# Patient Record
Sex: MALE | Race: WHITE | Employment: FULL TIME | URBAN - METROPOLITAN AREA
[De-identification: names, ages, dates, MRNs, and addresses within clinical notes are randomized per-mention and may not be internally consistent; named-entity substitution may affect disease eponyms.]

---

## 2019-01-08 ENCOUNTER — OFFICE VISIT (OUTPATIENT)
Dept: INTERNAL MEDICINE | Age: 49
End: 2019-01-08
Payer: COMMERCIAL

## 2019-01-08 VITALS
DIASTOLIC BLOOD PRESSURE: 84 MMHG | SYSTOLIC BLOOD PRESSURE: 138 MMHG | HEIGHT: 72 IN | HEART RATE: 82 BPM | WEIGHT: 315 LBS | BODY MASS INDEX: 42.66 KG/M2

## 2019-01-08 DIAGNOSIS — Z13.220 SCREENING CHOLESTEROL LEVEL: ICD-10-CM

## 2019-01-08 DIAGNOSIS — Z13.1 SCREENING FOR DIABETES MELLITUS: ICD-10-CM

## 2019-01-08 DIAGNOSIS — Z00.00 ANNUAL PHYSICAL EXAM: Primary | ICD-10-CM

## 2019-01-08 PROCEDURE — 99386 PREV VISIT NEW AGE 40-64: CPT | Performed by: FAMILY MEDICINE

## 2019-01-08 RX ORDER — WARFARIN SODIUM 5 MG/1
TABLET ORAL
Refills: 10 | COMMUNITY
Start: 2018-12-15

## 2019-01-08 ASSESSMENT — PATIENT HEALTH QUESTIONNAIRE - PHQ9
1. LITTLE INTEREST OR PLEASURE IN DOING THINGS: 0
2. FEELING DOWN, DEPRESSED OR HOPELESS: 0
SUM OF ALL RESPONSES TO PHQ QUESTIONS 1-9: 0
SUM OF ALL RESPONSES TO PHQ QUESTIONS 1-9: 0
SUM OF ALL RESPONSES TO PHQ9 QUESTIONS 1 & 2: 0

## 2019-01-08 ASSESSMENT — ENCOUNTER SYMPTOMS
CHEST TIGHTNESS: 0
EYE ITCHING: 0
EYE PAIN: 0
CHOKING: 0
APNEA: 0
EYE DISCHARGE: 0

## 2019-01-14 DIAGNOSIS — Z13.220 SCREENING CHOLESTEROL LEVEL: ICD-10-CM

## 2019-01-14 DIAGNOSIS — Z13.1 SCREENING FOR DIABETES MELLITUS: ICD-10-CM

## 2019-01-14 LAB
ALBUMIN SERPL-MCNC: 4.3 G/DL (ref 3.9–4.9)
ALP BLD-CCNC: 46 U/L (ref 35–104)
ALT SERPL-CCNC: 24 U/L (ref 0–41)
ANION GAP SERPL CALCULATED.3IONS-SCNC: 10 MEQ/L (ref 7–13)
AST SERPL-CCNC: 20 U/L (ref 0–40)
BILIRUB SERPL-MCNC: 0.4 MG/DL (ref 0–1.2)
BUN BLDV-MCNC: 15 MG/DL (ref 6–20)
CALCIUM SERPL-MCNC: 9.1 MG/DL (ref 8.6–10.2)
CHLORIDE BLD-SCNC: 105 MEQ/L (ref 98–107)
CHOLESTEROL, TOTAL: 133 MG/DL (ref 0–199)
CO2: 26 MEQ/L (ref 22–29)
CREAT SERPL-MCNC: 0.62 MG/DL (ref 0.7–1.2)
GFR AFRICAN AMERICAN: >60
GFR NON-AFRICAN AMERICAN: >60
GLOBULIN: 2.7 G/DL (ref 2.3–3.5)
GLUCOSE BLD-MCNC: 96 MG/DL (ref 74–109)
HDLC SERPL-MCNC: 40 MG/DL (ref 40–59)
LDL CHOLESTEROL CALCULATED: 67 MG/DL (ref 0–129)
POTASSIUM SERPL-SCNC: 4.9 MEQ/L (ref 3.5–5.1)
SODIUM BLD-SCNC: 141 MEQ/L (ref 132–144)
TOTAL PROTEIN: 7 G/DL (ref 6.4–8.1)
TRIGL SERPL-MCNC: 129 MG/DL (ref 0–200)

## 2019-04-08 ENCOUNTER — OFFICE VISIT (OUTPATIENT)
Dept: INTERNAL MEDICINE | Age: 49
End: 2019-04-08
Payer: COMMERCIAL

## 2019-04-08 VITALS
HEART RATE: 86 BPM | WEIGHT: 315 LBS | OXYGEN SATURATION: 96 % | SYSTOLIC BLOOD PRESSURE: 134 MMHG | TEMPERATURE: 98.4 F | DIASTOLIC BLOOD PRESSURE: 70 MMHG | HEIGHT: 72 IN | BODY MASS INDEX: 42.66 KG/M2

## 2019-04-08 DIAGNOSIS — R50.9 FEVER, UNSPECIFIED FEVER CAUSE: Primary | ICD-10-CM

## 2019-04-08 DIAGNOSIS — B34.9 VIRAL ILLNESS: ICD-10-CM

## 2019-04-08 LAB
INFLUENZA A ANTIBODY: NEGATIVE
INFLUENZA B ANTIBODY: NEGATIVE

## 2019-04-08 PROCEDURE — 99213 OFFICE O/P EST LOW 20 MIN: CPT | Performed by: NURSE PRACTITIONER

## 2019-04-08 PROCEDURE — 87804 INFLUENZA ASSAY W/OPTIC: CPT | Performed by: NURSE PRACTITIONER

## 2019-04-08 NOTE — PROGRESS NOTES
Patient: Tessy Brar    YOB: 1970    Date: 4/9/19       Patient Active Problem List    Diagnosis Date Noted    Pulmonary embolism [facility])      Past Medical History:   Diagnosis Date    DVT (deep venous thrombosis) (HCC)     Heart beat abnormality     due to caffeine    Hypertension     Phlebitis     Pulmonary embolism (HCC)      Past Surgical History:   Procedure Laterality Date    ABDOMINAL HERNIA REPAIR      CARPAL TUNNEL RELEASE Left     CHOLECYSTECTOMY      INGUINAL HERNIA REPAIR      KNEE SURGERY Bilateral     MANDIBLE SURGERY      ROTATOR CUFF REPAIR      SHOULDER SURGERY      WISDOM TOOTH EXTRACTION       Family History   Adopted: Yes   Family history unknown: Yes     Social History     Socioeconomic History    Marital status:      Spouse name: Not on file    Number of children: Not on file    Years of education: Not on file    Highest education level: Not on file   Occupational History    Not on file   Social Needs    Financial resource strain: Not on file    Food insecurity:     Worry: Not on file     Inability: Not on file    Transportation needs:     Medical: Not on file     Non-medical: Not on file   Tobacco Use    Smoking status: Former Smoker     Packs/day: 1.00     Years: 15.00     Pack years: 15.00     Last attempt to quit: 1/8/2009     Years since quitting: 10.2    Smokeless tobacco: Former User     Quit date: 1/8/2009   Substance and Sexual Activity    Alcohol use:  Yes    Drug use: No    Sexual activity: Yes     Partners: Female   Lifestyle    Physical activity:     Days per week: Not on file     Minutes per session: Not on file    Stress: Not on file   Relationships    Social connections:     Talks on phone: Not on file     Gets together: Not on file     Attends Rastafarian service: Not on file     Active member of club or organization: Not on file     Attends meetings of clubs or organizations: Not on file     Relationship status: Not on file   [place] Intimate partner violence:     Fear of current or ex partner: Not on file     Emotionally abused: Not on file     Physically abused: Not on file     Forced sexual activity: Not on file   Other Topics Concern    Not on file   Social History Narrative    Not on file     Current Outpatient Medications on File Prior to Visit   Medication Sig Dispense Refill    warfarin (COUMADIN) 5 MG tablet TAKE 2 TABLETS BY MOUTH EVERY DAY  10     No current facility-administered medications on file prior to visit. Allergies   Allergen Reactions    Seasonal        Chief Complaint   Patient presents with    Fatigue     x 2 days     Nausea     x 2 days     Fever     101.8  today        HPI  Symptoms: flu like symptom  Severity: moderate  Duration: 2 days  Alleviating/aggravting factors: motrin / tylenol slightly helping  Associated signs & symptoms: fever, dizziness, nausea, body aches    Review of Systems    Review of Systems   Constitutional: Positive for fever. HENT: Negative. Eyes: Negative. Respiratory: Negative. Cardiovascular: Negative. Gastrointestinal: Positive for nausea. Genitourinary: Negative. Musculoskeletal: Positive for myalgias. Skin: Negative. Neurological: Positive for dizziness. Psychiatric/Behavioral: Negative. Physical Exam  Vitals:    04/08/19 1822   BP: 134/70   Pulse: 86   Temp: 98.4 °F (36.9 °C)   SpO2: 96%   Body mass index is 55.71 kg/m². Physical Exam   Constitutional: He is oriented to person, place, and time. He appears well-developed. HENT:   Right Ear: External ear normal.   Left Ear: External ear normal.   Nose: Nose normal.   Mouth/Throat: No oropharyngeal exudate. Slight erythema to bilateral tms   Eyes: Conjunctivae are normal. Right eye exhibits no discharge. Left eye exhibits no discharge. Neck: Normal range of motion. Cardiovascular: Normal rate, regular rhythm and normal heart sounds.    Pulmonary/Chest: Effort normal and breath sounds normal. Abdominal: Soft. He exhibits no distension. Musculoskeletal: Normal range of motion. Neurological: He is alert and oriented to person, place, and time. Skin: Skin is warm and dry. He is not diaphoretic. Psychiatric: He has a normal mood and affect. Assessment:  Lara Fuchs was seen today for fatigue, nausea and fever. Diagnoses and all orders for this visit:    Fever, unspecified fever cause  -     POCT Influenza A/B    Viral illness  -     POCT Influenza A/B          Orders Placed This Encounter   Procedures    POCT Influenza A/B      Explained viral etiology and advised supportive care, over the counter analgesics for symptomatic therapy, tylenol/motrin for fevers, Fluids, rest  Advised patient to use otc antihistamine  Signs of worsening infection explained, signs of dehydration explained, to seek medical attention if they occur  rtc if not better  If not better at 10-14 day viral advised he/she can call in for antibiotics script  Hand out provided    Return for Follow up with pcp.

## 2019-04-08 NOTE — LETTER
[facility] [address] [address]  Phone: [phone]  Fax: [phone]    JEANIE Smalls CNP        April 8, 2019     Patient: Rell Vega   YOB: 1970   Date of Visit: 4/8/2019       To Whom it May Concern:    Rell Vega was seen in my clinic on 4/8/2019. He may return to work on 04/09/19. If you have any questions or concerns, please don't hesitate to call.     Sincerely,         JEANIE Smalls CNP

## 2019-04-09 ASSESSMENT — ENCOUNTER SYMPTOMS
EYES NEGATIVE: 1
RESPIRATORY NEGATIVE: 1
NAUSEA: 1

## 2019-04-10 ENCOUNTER — OFFICE VISIT (OUTPATIENT)
Dept: INTERNAL MEDICINE | Age: 49
End: 2019-04-10
Payer: COMMERCIAL

## 2019-04-10 VITALS
DIASTOLIC BLOOD PRESSURE: 82 MMHG | BODY MASS INDEX: 42.66 KG/M2 | RESPIRATION RATE: 22 BRPM | OXYGEN SATURATION: 97 % | TEMPERATURE: 97.5 F | HEART RATE: 87 BPM | SYSTOLIC BLOOD PRESSURE: 128 MMHG | HEIGHT: 72 IN | WEIGHT: 315 LBS

## 2019-04-10 DIAGNOSIS — R35.0 URINE FREQUENCY: ICD-10-CM

## 2019-04-10 DIAGNOSIS — N30.01 ACUTE CYSTITIS WITH HEMATURIA: Primary | ICD-10-CM

## 2019-04-10 DIAGNOSIS — N30.01 ACUTE CYSTITIS WITH HEMATURIA: ICD-10-CM

## 2019-04-10 LAB
BILIRUBIN, POC: ABNORMAL
BLOOD URINE, POC: ABNORMAL
CLARITY, POC: ABNORMAL
COLOR, POC: ABNORMAL
GLUCOSE URINE, POC: ABNORMAL
KETONES, POC: ABNORMAL
LEUKOCYTE EST, POC: ABNORMAL
NITRITE, POC: ABNORMAL
PH, POC: 5.5
PROTEIN, POC: 15
SPECIFIC GRAVITY, POC: 1.02
UROBILINOGEN, POC: 0.2

## 2019-04-10 PROCEDURE — 81003 URINALYSIS AUTO W/O SCOPE: CPT | Performed by: NURSE PRACTITIONER

## 2019-04-10 PROCEDURE — 99213 OFFICE O/P EST LOW 20 MIN: CPT | Performed by: NURSE PRACTITIONER

## 2019-04-10 RX ORDER — PHENAZOPYRIDINE HYDROCHLORIDE 200 MG/1
200 TABLET, FILM COATED ORAL 3 TIMES DAILY PRN
Qty: 10 TABLET | Refills: 0 | Status: SHIPPED | OUTPATIENT
Start: 2019-04-10 | End: 2019-04-12

## 2019-04-10 RX ORDER — NITROFURANTOIN 25; 75 MG/1; MG/1
100 CAPSULE ORAL 2 TIMES DAILY
Qty: 14 CAPSULE | Refills: 0 | Status: SHIPPED | OUTPATIENT
Start: 2019-04-10 | End: 2019-04-17

## 2019-04-10 ASSESSMENT — ENCOUNTER SYMPTOMS
NAUSEA: 0
VOMITING: 0
COUGH: 0
DIARRHEA: 0
ABDOMINAL PAIN: 1
WHEEZING: 0
RHINORRHEA: 0
SORE THROAT: 0
SHORTNESS OF BREATH: 0

## 2019-04-10 NOTE — PROGRESS NOTES
Subjective  Semaj Oscar, 54 y.o. male presents today with:  Chief Complaint   Patient presents with    Urinary Tract Infection     dark urine w blood, has a blood clot pass today urine frequency, pressure, feels like urine doen't stop     Diarrhea     today        Urinary Tract Infection    This is a new problem. Episode onset: 2 weeks ago. The problem occurs every urination. The problem has been gradually worsening. The quality of the pain is described as burning. The pain is at a severity of 3/10 (7-8/10 when voiding). The pain is severe. The maximum temperature recorded prior to his arrival was 101 - 101.9 F. The fever has been present for 1 - 2 days. He is sexually active. There is no history of pyelonephritis. Associated symptoms include chills, frequency, hematuria and urgency. Pertinent negatives include no flank pain, nausea or vomiting. He has tried increased fluids for the symptoms. Review of Systems   Constitutional: Positive for chills, fatigue and fever. HENT: Negative for congestion, rhinorrhea and sore throat. Respiratory: Negative for cough, shortness of breath and wheezing. Gastrointestinal: Positive for abdominal pain (pressure). Negative for diarrhea, nausea and vomiting. Genitourinary: Positive for frequency, hematuria and urgency. Negative for flank pain.        Past Medical History:   Diagnosis Date    [facility] ([facility])     Heart beat abnormality     due to caffeine    Hypertension     Phlebitis     Pulmonary embolism (HCC)      Past Surgical History:   Procedure Laterality Date    ABDOMINAL HERNIA REPAIR      CARPAL TUNNEL RELEASE Left     CHOLECYSTECTOMY      INGUINAL HERNIA REPAIR      KNEE SURGERY Bilateral     MANDIBLE SURGERY      ROTATOR CUFF REPAIR      SHOULDER SURGERY      WISDOM TOOTH EXTRACTION       Social History     Socioeconomic History    Marital status:      Spouse name: Not on file    Number of children: Not on file    Years of education: Not on file    Highest education level: Not on file   Occupational History    Not on file   Social Needs    Financial resource strain: Not on file    Food insecurity:     Worry: Not on file     Inability: Not on file    Transportation needs:     Medical: Not on file     Non-medical: Not on file   Tobacco Use    Smoking status: Former Smoker     Packs/day: 1.00     Years: 15.00     Pack years: 15.00     Last attempt to quit: 1/8/2009     Years since quitting: 10.2    Smokeless tobacco: Former User     Quit date: 1/8/2009   Substance and Sexual Activity    Alcohol use: Yes    Drug use: No    Sexual activity: Yes     Partners: Female   Lifestyle    Physical activity:     Days per week: Not on file     Minutes per session: Not on file    Stress: Not on file   Relationships    Social connections:     Talks on phone: Not on file     Gets together: Not on file     Attends [employer] service: Not on file     Active member of club or organization: Not on file     Attends meetings of clubs or organizations: Not on file     Relationship status: Not on file    Intimate partner violence:     Fear of current or ex partner: Not on file     Emotionally abused: Not on file     Physically abused: Not on file     Forced sexual activity: Not on file   Other Topics Concern    Not on file   Social History Narrative    Not on file     Family History   Adopted: Yes   Family history unknown: Yes     Allergies   Allergen Reactions    Seasonal      Current Outpatient Medications   Medication Sig Dispense Refill    nitrofurantoin, macrocrystal-monohydrate, (MACROBID) 100 MG capsule Take 1 capsule by mouth 2 times daily for 7 days 14 capsule 0    phenazopyridine (PYRIDIUM) 200 MG tablet Take 1 tablet by mouth 3 times daily as needed for Pain (Painful Urination) May turn urine orange.  10 tablet 0    warfarin (COUMADIN) 5 MG tablet TAKE 2 TABLETS BY MOUTH EVERY DAY  10     No current facility-administered medications for this visit. PMH, Surgical Hx, Family Hx, and Social Hx reviewed and updated. Health Maintenance reviewed. Objective  Vitals:    04/10/19 1501   BP: 128/82   Pulse: 87   Resp: 22   Temp: 97.5 °F (36.4 °C)   TempSrc: Oral   SpO2: 97%   Weight: (!) 410 lb (186 kg)   Height: 6' (1.829 m)     BP Readings from Last 3 Encounters:   04/10/19 128/82   04/08/19 134/70   01/08/19 138/84     Wt Readings from Last 3 Encounters:   04/10/19 (!) 410 lb (186 kg)   04/08/19 (!) 410 lb 12.8 oz (186.3 kg)   01/08/19 (!) 406 lb 3.2 oz (184.3 kg)     Physical Exam   Constitutional: He is oriented to person, place, and time. He appears well-developed and well-nourished. He is active. HENT:   Right Ear: Tympanic membrane normal.   Left Ear: Tympanic membrane normal.   Nose: Nose normal. Right sinus exhibits no maxillary sinus tenderness and no frontal sinus tenderness. Left sinus exhibits no maxillary sinus tenderness and no frontal sinus tenderness. Mouth/Throat: Uvula is midline, oropharynx is clear and moist and mucous membranes are normal.   Eyes: Pupils are equal, round, and reactive to light. Conjunctivae and EOM are normal.   Neck: Full passive range of motion without pain. No muscular tenderness present. Cardiovascular: Normal rate, regular rhythm, S1 normal, S2 normal and normal heart sounds. Pulmonary/Chest: Effort normal and breath sounds normal. No respiratory distress. He has no wheezes. He has no rhonchi. He has no rales. Abdominal: Soft. Normal appearance and bowel sounds are normal. There is tenderness (pressure) in the suprapubic area. There is no CVA tenderness. Musculoskeletal: Normal range of motion. Neurological: He is alert and oriented to person, place, and time. He has normal strength. Skin: Skin is warm, dry and intact. No rash noted. Psychiatric: He has a normal mood and affect.  His speech is normal and behavior is normal.     Assessment & Plan Diagnosis Orders   1. Acute cystitis with hematuria  Urine Culture    nitrofurantoin, macrocrystal-monohydrate, (MACROBID) 100 MG capsule    phenazopyridine (PYRIDIUM) 200 MG tablet   2. Urine frequency  POCT Urinalysis No Micro (Auto)    Urine Culture     Orders Placed This Encounter   Procedures    Urine Culture     Standing Status:   Future     Standing Expiration Date:   4/10/2020     Order Specific Question:   Specify (ex-cath, midstream, cysto, etc)? Answer:   clean    POCT Urinalysis No Micro (Auto)     Orders Placed This Encounter   Medications    nitrofurantoin, macrocrystal-monohydrate, (MACROBID) 100 MG capsule     Sig: Take 1 capsule by mouth 2 times daily for 7 days     Dispense:  14 capsule     Refill:  0    phenazopyridine (PYRIDIUM) 200 MG tablet     Sig: Take 1 tablet by mouth 3 times daily as needed for Pain (Painful Urination) May turn urine orange. Dispense:  10 tablet     Refill:  0     There are no discontinued medications. Return in about 2 weeks (around 4/24/2019), or if symptoms worsen or fail to improve, for follow up with PCP. Reviewed with the patient: current clinical status,medications, activities and diet. Drink plenty of fluids  If no improvement after 48-72 hours return for reevaluation    Side effects, adverse effects of themedication prescribed today, as well as treatment plan/ rationale and result expectations have been discussed with the patient who expresses understanding and desires to proceed. Close follow up to evaluate treatment results and for coordination of care. I have reviewed the patient's medical history in detail and updated the computerized patient record.     JEANIE Ibarra

## 2019-04-13 LAB
ORGANISM: ABNORMAL
URINE CULTURE, ROUTINE: ABNORMAL
URINE CULTURE, ROUTINE: ABNORMAL

## 2020-06-09 ENCOUNTER — OFFICE VISIT (OUTPATIENT)
Dept: INTERNAL MEDICINE | Age: 50
End: 2020-06-09
Payer: COMMERCIAL

## 2020-06-09 VITALS
HEART RATE: 66 BPM | DIASTOLIC BLOOD PRESSURE: 98 MMHG | HEIGHT: 72 IN | WEIGHT: 315 LBS | BODY MASS INDEX: 42.66 KG/M2 | SYSTOLIC BLOOD PRESSURE: 144 MMHG | TEMPERATURE: 97.2 F | OXYGEN SATURATION: 98 %

## 2020-06-09 PROBLEM — G47.33 OSA (OBSTRUCTIVE SLEEP APNEA): Status: ACTIVE | Noted: 2020-06-09

## 2020-06-09 PROBLEM — I48.91 ATRIAL FIBRILLATION, TRANSIENT (HCC): Status: ACTIVE | Noted: 2020-06-09

## 2020-06-09 PROCEDURE — 99396 PREV VISIT EST AGE 40-64: CPT | Performed by: FAMILY MEDICINE

## 2020-06-09 RX ORDER — FLECAINIDE ACETATE 100 MG/1
TABLET ORAL
COMMUNITY
Start: 2020-05-28

## 2020-06-09 RX ORDER — METOPROLOL SUCCINATE 25 MG/1
TABLET, EXTENDED RELEASE ORAL
COMMUNITY
Start: 2020-05-08

## 2020-06-09 ASSESSMENT — PATIENT HEALTH QUESTIONNAIRE - PHQ9
SUM OF ALL RESPONSES TO PHQ QUESTIONS 1-9: 0
2. FEELING DOWN, DEPRESSED OR HOPELESS: 0
1. LITTLE INTEREST OR PLEASURE IN DOING THINGS: 0
SUM OF ALL RESPONSES TO PHQ QUESTIONS 1-9: 0
SUM OF ALL RESPONSES TO PHQ9 QUESTIONS 1 & 2: 0

## 2020-06-09 NOTE — PROGRESS NOTES
Patient: Malia Hercules    YOB: 1970    Date: 20       Patient Active Problem List    Diagnosis Date Noted    Atrial fibrillation, transient ([facility] 2020     Priority: High     Overview Note:     Cardiology   S/p cardioversion - 2019      Pulmonary embolism ([facility]      Priority: High    GOMEZ (obstructive sleep apnea) 2020     Priority: Medium     Overview Note:     CPAP       Past Medical History:   Diagnosis Date    A-fib ([facility]     DVT [facility]deep venous thrombosis) ([facility])     Heart beat abnormality     due to caffeine    Hypertension     Phlebitis     Pulmonary embolism (HCC)      Past Surgical History:   Procedure Laterality Date    ABDOMINAL HERNIA REPAIR      CARPAL TUNNEL RELEASE Left     CHOLECYSTECTOMY      INGUINAL HERNIA REPAIR      KNEE SURGERY Bilateral     MANDIBLE SURGERY      ROTATOR CUFF REPAIR      SHOULDER SURGERY      WISDOM TOOTH EXTRACTION       Family History   Adopted: Yes   Family history unknown: Yes     Social History     Socioeconomic History    Marital status:      Spouse name: Not on file    Number of children: Not on file    Years of education: Not on file    Highest education level: Not on file   Occupational History    Not on file   Social Needs    Financial resource strain: Not on file    Food insecurity     Worry: Not on file     Inability: Not on file    Transportation needs     Medical: Not on file     Non-medical: Not on file   Tobacco Use    Smoking status: Former Smoker     Packs/day: 1.00     Years: 15.00     Pack years: 15.00     Last attempt to quit: 2009     Years since quittin.4    Smokeless tobacco: Former User     Quit date: 2009   Substance and Sexual Activity    Alcohol use:  Yes    Drug use: No    Sexual activity: Yes     Partners: Female   Lifestyle    Physical activity     Days per week: Not on file     Minutes per session: Not on file    Stress: Not on file   Relationships    Social medications    Elevated blood sugar  -     Comprehensive Metabolic Panel; Future  -     Hemoglobin A1C; Future  Check levels    Screening cholesterol level  -     Lipid Panel;  Future    Forms completed in office during visit       Orders Placed This Encounter   Procedures    Lipid Panel     Standing Status:   Future     Standing Expiration Date:   6/9/2021     Order Specific Question:   Is Patient Fasting?/# of Hours     Answer:   yes    Comprehensive Metabolic Panel     Standing Status:   Future     Standing Expiration Date:   6/9/2021    Hemoglobin A1C     Standing Status:   Future     Standing Expiration Date:   6/9/2021          Return in about 1 year (around 6/9/2021) for Yearly Exam.

## 2020-06-11 ENCOUNTER — PATIENT MESSAGE (OUTPATIENT)
Dept: INTERNAL MEDICINE | Age: 50
End: 2020-06-11

## 2020-06-11 NOTE — TELEPHONE ENCOUNTER
From: Omid Madera  To: Jose Betancourt MD  Sent: 6/11/2020 8:07 AM EDT  Subject: Non-Urgent Medical Question    Hello,  After I saw you for my yearly follow up in Tuesday this week I received additional requests from the bariatric surgeon. They need the last 2 years weight history from your office and I need additional blood work. I have not gone for the blood work you ordered, if you can add it and have the order mailed to me that would be great. I will attach an image of the labs he is requesting as well as the the weight history. The weight history can be faxed to [phone]  Please call with any questions or concerns. Let me know if your able to order the blood work. The blood work is : TSH, H-pylori, Tox screen or drug screen.     Thank you,  iKt Levine

## 2020-06-12 NOTE — TELEPHONE ENCOUNTER
Sent a message to pt, all testing is ordered. Per [facility] EM[facility] the H Pylori test is stool. Will wait to see if pt would like to send all testing and weight flow sheet sent together.

## 2020-07-08 LAB
ALBUMIN SERPL-MCNC: 4.2 G/DL
ALP BLD-CCNC: 48 U/L
ALT SERPL-CCNC: 31 U/L
ANION GAP SERPL CALCULATED.3IONS-SCNC: 8 MMOL/L
AST SERPL-CCNC: 232 U/L
AVERAGE GLUCOSE: 111
BILIRUB SERPL-MCNC: 0.7 MG/DL (ref 0.1–1.4)
BUN BLDV-MCNC: 17 MG/DL
CALCIUM SERPL-MCNC: 9 MG/DL
CHLORIDE BLD-SCNC: 106 MMOL/L
CHOLESTEROL, TOTAL: 113 MG/DL
CHOLESTEROL/HDL RATIO: 1.97
CO2: 24 MMOL/L
CREAT SERPL-MCNC: 0.71 MG/DL
GFR CALCULATED: >60
GLUCOSE BLD-MCNC: 101 MG/DL
HBA1C MFR BLD: 5.5 %
HDLC SERPL-MCNC: 32 MG/DL (ref 35–70)
LDL CHOLESTEROL CALCULATED: 63 MG/DL (ref 0–160)
POTASSIUM SERPL-SCNC: 4.1 MMOL/L
SODIUM BLD-SCNC: 138 MMOL/L
TOTAL PROTEIN: 6.9
TRIGL SERPL-MCNC: 91 MG/DL
TSH SERPL DL<=0.05 MIU/L-ACNC: 3.19 UIU/ML
VLDLC SERPL CALC-MCNC: 18 MG/DL

## 2021-06-15 ENCOUNTER — OFFICE VISIT (OUTPATIENT)
Dept: INTERNAL MEDICINE | Age: 51
End: 2021-06-15
Payer: COMMERCIAL

## 2021-06-15 VITALS
OXYGEN SATURATION: 98 % | TEMPERATURE: 97.9 F | HEART RATE: 54 BPM | HEIGHT: 72 IN | DIASTOLIC BLOOD PRESSURE: 68 MMHG | SYSTOLIC BLOOD PRESSURE: 112 MMHG | BODY MASS INDEX: 35.49 KG/M2 | WEIGHT: 262 LBS

## 2021-06-15 DIAGNOSIS — Z98.84 H/O GASTRIC BYPASS: ICD-10-CM

## 2021-06-15 DIAGNOSIS — Z00.00 ANNUAL PHYSICAL EXAM: Primary | ICD-10-CM

## 2021-06-15 DIAGNOSIS — G47.30 SLEEP APNEA, UNSPECIFIED TYPE: ICD-10-CM

## 2021-06-15 DIAGNOSIS — E65 ABDOMINAL PANNUS: ICD-10-CM

## 2021-06-15 DIAGNOSIS — Z12.5 SCREENING FOR PROSTATE CANCER: ICD-10-CM

## 2021-06-15 DIAGNOSIS — Z12.11 SCREEN FOR COLON CANCER: ICD-10-CM

## 2021-06-15 PROCEDURE — 99396 PREV VISIT EST AGE 40-64: CPT | Performed by: FAMILY MEDICINE

## 2021-06-15 RX ORDER — IBUPROFEN 200 MG
1 CAPSULE ORAL DAILY
COMMUNITY

## 2021-06-15 SDOH — ECONOMIC STABILITY: FOOD INSECURITY: WITHIN THE PAST 12 MONTHS, THE FOOD YOU BOUGHT JUST DIDN'T LAST AND YOU DIDN'T HAVE MONEY TO GET MORE.: NEVER TRUE

## 2021-06-15 SDOH — ECONOMIC STABILITY: FOOD INSECURITY: WITHIN THE PAST 12 MONTHS, YOU WORRIED THAT YOUR FOOD WOULD RUN OUT BEFORE YOU GOT MONEY TO BUY MORE.: NEVER TRUE

## 2021-06-15 ASSESSMENT — PATIENT HEALTH QUESTIONNAIRE - PHQ9
1. LITTLE INTEREST OR PLEASURE IN DOING THINGS: 0
SUM OF ALL RESPONSES TO PHQ QUESTIONS 1-9: 0
SUM OF ALL RESPONSES TO PHQ9 QUESTIONS 1 & 2: 0
SUM OF ALL RESPONSES TO PHQ QUESTIONS 1-9: 0
2. FEELING DOWN, DEPRESSED OR HOPELESS: 0
SUM OF ALL RESPONSES TO PHQ QUESTIONS 1-9: 0

## 2021-06-15 ASSESSMENT — SOCIAL DETERMINANTS OF HEALTH (SDOH): HOW HARD IS IT FOR YOU TO PAY FOR THE VERY BASICS LIKE FOOD, HOUSING, MEDICAL CARE, AND HEATING?: NOT HARD AT ALL

## 2021-06-15 NOTE — PROGRESS NOTES
Patient: Luke Lopez    YOB: 1970    Date: 6/15/21       Patient Active Problem List    Diagnosis Date Noted    Atrial fibrillation, transient ([facility] 2020     Priority: High     Overview Note:     Cardiology   S/p cardioversion - 2019      Pulmonary embolism ([facility]      Priority: High    GOMEZ (obstructive sleep apnea) 2020     Priority: Medium     Overview Note:     CPAP       Past Medical History:   Diagnosis Date    A-fib ([facility]     DVT (deep venous thrombosis) (HCC)     Heart beat abnormality     due to caffeine    Hypertension     Phlebitis     Pulmonary embolism (HCC)      Past Surgical History:   Procedure Laterality Date    ABDOMINAL HERNIA REPAIR      CARPAL TUNNEL RELEASE Left     CHOLECYSTECTOMY      INGUINAL HERNIA REPAIR      KNEE SURGERY Bilateral     MANDIBLE SURGERY      ROTATOR CUFF REPAIR      SHOULDER SURGERY      WISDOM TOOTH EXTRACTION       Family History   Adopted: Yes   Family history unknown: Yes     Social History     Socioeconomic History    Marital status:      Spouse name: Not on file    Number of children: Not on file    Years of education: Not on file    Highest education level: Not on file   Occupational History    Not on file   Tobacco Use    Smoking status: Former Smoker     Packs/day: 1.00     Years: 15.00     Pack years: 15.00     Quit date: 2009     Years since quittin.4    Smokeless tobacco: Former User     Quit date: 2009   Vaping Use    Vaping Use: Never used   Substance and Sexual Activity    Alcohol use:  Yes    Drug use: No    Sexual activity: Yes     Partners: Female   Other Topics Concern    Not on file   Social History Narrative    Not on file     Social Determinants of Health     Financial Resource Strain: Low Risk     Difficulty of Paying Living Expenses: Not hard at all   Food Insecurity: No Food Insecurity    Worried About [address] in the Last Year: Never true   [employer] of [employer] in the Last Year: Never true   Transportation Needs:     Lack of Transportation (Medical):  Lack of Transportation (Non-Medical):    Physical Activity:     Days of Exercise per Week:     Minutes of Exercise per Session:    Stress:     Feeling of Stress :    Social Connections:     Frequency of Communication with Friends and Family:     Frequency of Social Gatherings with Friends and Family:     Attends Sabianism Services:     Active Member of Clubs or Organizations:     Attends Club or Organization Meetings:     Marital Status:    Intimate Partner Violence:     Fear of Current or Ex-Partner:     Emotionally Abused:     Physically Abused:     Sexually Abused:      Current Outpatient Medications on File Prior to Visit   Medication Sig Dispense Refill    calcium citrate (CALCITRATE) 950 (200 Ca) MG tablet Take 1 tablet by mouth daily      Fexofenadine HCl (ALLEGRA PO) Take by mouth      flecainide (TAMBOCOR) 100 MG tablet TAKE 1 TABLET BY MOUTH TWICE A DAY      metoprolol succinate (TOPROL XL) 25 MG extended release tablet TAKE 1 TABLET BY MOUTH TWICE EVERY DAY      warfarin (COUMADIN) 5 MG tablet 15 mg Mon, Wed, Fri and 10 mg all other days. 10     No current facility-administered medications on file prior to visit.      Allergies   Allergen Reactions    Seasonal        BP Readings from Last 4 Encounters:   06/15/21 112/68   06/09/20 (!) 144/98   04/10/19 128/82   04/08/19 134/70   ]  Wt Readings from Last 4 Encounters:   06/15/21 272 lb (123.4 kg)   06/09/20 (!) 425 lb (192.8 kg)   04/10/19 (!) 410 lb (186 kg)   04/08/19 (!) 410 lb 12.8 oz (186.3 kg)   ]    No components found for: HBA1C)]  Lab Results   Component Value Date    CHOL 113 06/22/2020    CHOL 133 01/14/2019       Lab Results   Component Value Date    HDL 32 06/22/2020    HDL 40 01/14/2019     No components found for: LDL  No components found for: TG]    Chief Complaint   Patient presents with    Annual Exam       HPI - Annual Physical Exam    Pt has hx of a.fib, past DVTs, GOMEZ - all related to weight     S/p gastric bypass   Hx of abdominal hernia surgery years ago    Wt Readings from Last 3 Encounters:   06/15/21 272 lb (123.4 kg)   20 (!) 425 lb (192.8 kg)   04/10/19 (!) 410 lb (186 kg)     Neck pain x 1.5 month  Hx of head trauma, generator fell on head  Numbness comes & goes in hands    B/L shoulder pain, ongoing    Social History     Substance and Sexual Activity   Sexual Activity Yes    Partners: Female     Social History     Substance and Sexual Activity   Alcohol Use Yes     Social History     Tobacco Use   Smoking Status Former Smoker    Packs/day: 1.00    Years: 15.00    Pack years: 15.00    Quit date: 2009    Years since quittin.4   Smokeless Tobacco Former User    Quit date: 2009     Social History     Substance and Sexual Activity   Drug Use No           Review of Systems  Constitutional: Negative for fatigue, fever and sweats. HEENT: Negative for eye discharge and vision loss. Negative for ear drainage, hearing loss and nasal drainage. Respiratory: Negative for cough, dyspnea and wheezing. Cardiovascular:  Negative for chest pain, claudication and irregular heartbeat/palpitations. Vitals:    06/15/21 0847   BP: 112/68   Pulse: 54   Temp: 97.9 °F (36.6 °C)   SpO2: 98%   Body mass index is 36.89 kg/m². Physical Exam  Constitutional:  Appears well-developed and well-nourished. No distress. HENT:   Head: Normocephalic and atraumatic. Right Ear: External ear normal.   Left Ear: External ear normal.   Nose: Nose normal.   Eyes: Conjunctivae and EOM are normal.  Right eye exhibits no discharge. Left eye exhibits no discharge. No scleral icterus. Neck: Normal range of motion. Cardiovascular: Normal rate, regular rhythm and normal heart sounds. No murmur heard. Pulmonary/Chest: Effort normal and breath sounds normal. No respiratory distress. no wheezes. no rales. no tenderness. Abdomen: soft, NT, ND, + pannus L>R   Musculoskeletal: Normal range of motion. Neurological: alert. Skin: not diaphoretic. Psychiatric: normal mood and affect. behavior is normal. Judgment and thought content normal.   Nursing note and vitals reviewed. Assessment:  Armando Hernandez was seen today for annual exam.    Diagnoses and all orders for this visit:    Annual physical exam    Screening for prostate cancer  -     PSA Screening;  Future    Screen for colon cancer  -     Ambulatory referral to Gastroenterology    H/O gastric bypass  -     Amb External Referral To Plastic Surgery    Abdominal pannus  -     Amb External Referral To Plastic Surgery            Orders Placed This Encounter   Procedures    PSA Screening     Standing Status:   Future     Standing Expiration Date:   12/15/2022    Ambulatory referral to Gastroenterology     Referral Priority:   Routine     Referral Type:   Eval and Treat     Referral Reason:   Specialty Services Required     Requested Specialty:   Gastroenterology     Number of Visits Requested:   1    Amb External Referral To Plastic Surgery     Referral Priority:   Routine     Referral Type:   Eval and Treat     Referral Reason:   Specialty Services Required     Referred to Provider:   Adalid Quinones MD     Requested Specialty:   Plastic Surgery     Number of Visits Requested:   1          Return in about 1 year (around 6/15/2022) for Yearly Exam.

## 2022-02-16 ENCOUNTER — HOSPITAL ENCOUNTER (OUTPATIENT)
Age: 52
Setting detail: OUTPATIENT SURGERY
Discharge: HOME | End: 2022-02-16
Attending: INTERNAL MEDICINE | Admitting: INTERNAL MEDICINE
Payer: COMMERCIAL

## 2022-02-16 ENCOUNTER — ANESTHESIA EVENT (OUTPATIENT)
Dept: ENDOSCOPY | Age: 52
End: 2022-02-16
Payer: COMMERCIAL

## 2022-02-16 ENCOUNTER — ANESTHESIA (OUTPATIENT)
Dept: ENDOSCOPY | Age: 52
End: 2022-02-16
Payer: COMMERCIAL

## 2022-02-16 ENCOUNTER — ANCILLARY PROCEDURE (OUTPATIENT)
Dept: ENDOSCOPY | Age: 52
End: 2022-02-16
Attending: INTERNAL MEDICINE
Payer: COMMERCIAL

## 2022-02-16 VITALS
DIASTOLIC BLOOD PRESSURE: 63 MMHG | TEMPERATURE: 97.9 F | BODY MASS INDEX: 35.89 KG/M2 | OXYGEN SATURATION: 96 % | HEART RATE: 76 BPM | SYSTOLIC BLOOD PRESSURE: 106 MMHG | WEIGHT: 265 LBS | HEIGHT: 72 IN | RESPIRATION RATE: 18 BRPM

## 2022-02-16 VITALS — DIASTOLIC BLOOD PRESSURE: 60 MMHG | OXYGEN SATURATION: 97 % | SYSTOLIC BLOOD PRESSURE: 98 MMHG

## 2022-02-16 PROCEDURE — 2580000003 HC RX 258: Performed by: INTERNAL MEDICINE

## 2022-02-16 PROCEDURE — 6370000000 HC RX 637 (ALT 250 FOR IP): Performed by: INTERNAL MEDICINE

## 2022-02-16 PROCEDURE — 3700000000 HC ANESTHESIA ATTENDED CARE: Performed by: INTERNAL MEDICINE

## 2022-02-16 PROCEDURE — 3609027000 HC COLONOSCOPY: Performed by: INTERNAL MEDICINE

## 2022-02-16 PROCEDURE — 3700000001 HC ADD 15 MINUTES (ANESTHESIA): Performed by: INTERNAL MEDICINE

## 2022-02-16 PROCEDURE — 7100000010 HC PHASE II RECOVERY - FIRST 15 MIN: Performed by: INTERNAL MEDICINE

## 2022-02-16 PROCEDURE — 6360000002 HC RX W HCPCS: Performed by: NURSE ANESTHETIST, CERTIFIED REGISTERED

## 2022-02-16 PROCEDURE — 2709999900 HC NON-CHARGEABLE SUPPLY: Performed by: INTERNAL MEDICINE

## 2022-02-16 PROCEDURE — 2580000003 HC RX 258: Performed by: NURSE ANESTHETIST, CERTIFIED REGISTERED

## 2022-02-16 PROCEDURE — 2580000003 HC RX 258

## 2022-02-16 PROCEDURE — 7100000011 HC PHASE II RECOVERY - ADDTL 15 MIN: Performed by: INTERNAL MEDICINE

## 2022-02-16 RX ORDER — SIMETHICONE 20 MG/.3ML
EMULSION ORAL PRN
Status: DISCONTINUED | OUTPATIENT
Start: 2022-02-16 | End: 2022-02-16 | Stop reason: ALTCHOICE

## 2022-02-16 RX ORDER — SODIUM CHLORIDE 9 MG/ML
INJECTION, SOLUTION INTRAVENOUS CONTINUOUS PRN
Status: DISCONTINUED | OUTPATIENT
Start: 2022-02-16 | End: 2022-02-16 | Stop reason: SDUPTHER

## 2022-02-16 RX ORDER — PROPOFOL 10 MG/ML
INJECTION, EMULSION INTRAVENOUS PRN
Status: DISCONTINUED | OUTPATIENT
Start: 2022-02-16 | End: 2022-02-16 | Stop reason: SDUPTHER

## 2022-02-16 RX ORDER — SODIUM CHLORIDE 9 MG/ML
INJECTION, SOLUTION INTRAVENOUS
Status: COMPLETED
Start: 2022-02-16 | End: 2022-02-16

## 2022-02-16 RX ORDER — MAGNESIUM HYDROXIDE 1200 MG/15ML
LIQUID ORAL PRN
Status: DISCONTINUED | OUTPATIENT
Start: 2022-02-16 | End: 2022-02-16 | Stop reason: ALTCHOICE

## 2022-02-16 RX ORDER — M-VIT,TX,IRON,MINS/CALC/FOLIC 27MG-0.4MG
1 TABLET ORAL DAILY
COMMUNITY

## 2022-02-16 RX ORDER — SODIUM CHLORIDE 9 MG/ML
INJECTION, SOLUTION INTRAVENOUS ONCE
Status: COMPLETED | OUTPATIENT
Start: 2022-02-16 | End: 2022-02-16

## 2022-02-16 RX ADMIN — PROPOFOL 200 MG: 10 INJECTION, EMULSION INTRAVENOUS at 07:35

## 2022-02-16 RX ADMIN — PROPOFOL 200 MG: 10 INJECTION, EMULSION INTRAVENOUS at 07:48

## 2022-02-16 RX ADMIN — SODIUM CHLORIDE: 9 INJECTION, SOLUTION INTRAVENOUS at 07:17

## 2022-02-16 RX ADMIN — PROPOFOL 200 MG: 10 INJECTION, EMULSION INTRAVENOUS at 07:40

## 2022-02-16 RX ADMIN — SODIUM CHLORIDE: 9 INJECTION, SOLUTION INTRAVENOUS at 07:27

## 2022-02-16 ASSESSMENT — PULMONARY FUNCTION TESTS
PIF_VALUE: 0

## 2022-02-16 ASSESSMENT — PAIN - FUNCTIONAL ASSESSMENT: PAIN_FUNCTIONAL_ASSESSMENT: 0-10

## 2022-02-16 NOTE — ANESTHESIA PRE PROCEDURE
Department of Anesthesiology  Preprocedure Note       Name:  Ga Montes   Age:  50 y.o.  :  1970                                          MRN:  [id number]         Date:  2022      Surgeon: Chanelle Hoang):  Dima Jeong MD    Procedure: Procedure(s):  COLORECTAL CANCER SCREENING, NOT HIGH RISK    Medications prior to admission:   Prior to Admission medications    Medication Sig Start Date End Date Taking? Authorizing Provider   Multiple Vitamins-Minerals (THERAPEUTIC MULTIVITAMIN-MINERALS) tablet Take 1 tablet by mouth daily   Yes Historical Provider, MD   Cyanocobalamin (VITAMIN B-12 PO) Take by mouth daily   Yes Historical Provider, MD   calcium citrate (CALCITRATE) 950 (200 Ca) MG tablet Take 1 tablet by mouth daily   Yes Historical Provider, MD   warfarin (COUMADIN) 5 MG tablet 15 mg Mon, Wed, Fri and 10 mg all other days. 12/15/18  Yes Historical Provider, MD   Fexofenadine HCl (ALLEGRA PO) Take by mouth    Historical Provider, MD   flecainide (TAMBOCOR) 100 MG tablet TAKE 1 TABLET BY MOUTH TWICE A DAY 20   Historical Provider, MD   metoprolol succinate (TOPROL XL) 25 MG extended release tablet TAKE 1 TABLET BY MOUTH TWICE EVERY DAY 20   Historical Provider, MD       Current medications:    No current facility-administered medications for this encounter. Allergies:     Allergies   Allergen Reactions    Seasonal        Problem List:    Patient Active Problem List   Diagnosis Code    Pulmonary embolism (HCC) I26.99    Atrial fibrillation, transient ([facility] [address] I48.91    GOMEZ (obstructive sleep apnea) G47.33       Past Medical History:        Diagnosis Date    A-fib ([facility] [address] [facility]     DVT (deep venous thrombosis) (HCC)     Heart beat abnormality     due to caffeine    Hypertension     Phlebitis     Pulmonary embolism (HCC)        Past Surgical History:        Procedure Laterality Date    ABDOMINAL HERNIA REPAIR      ABLATION OF DYSRHYTHMIC FOCUS      Cardiac ablation    BARIATRIC SURGERY gastric sleeve    CARPAL TUNNEL RELEASE Left     CHOLECYSTECTOMY      INGUINAL HERNIA REPAIR      KNEE SURGERY Bilateral     MANDIBLE SURGERY      ROTATOR CUFF REPAIR      SHOULDER SURGERY      WISDOM TOOTH EXTRACTION         Social History:    Social History     Tobacco Use    Smoking status: Former Smoker     Packs/day: 1.00     Years: 15.00     Pack years: 15.00     Types: Cigarettes     Quit date: 2009     Years since quittin.1    Smokeless tobacco: Former User     Quit date: 2009   Substance Use Topics    Alcohol use: Yes     Comment: social/occassional                                Counseling given: Not Answered      Vital Signs (Current):   Vitals:    22 0658   BP: 120/78   Pulse: 87   Resp: 18   Temp: 36.6 °C (97.9 °F)   TempSrc: Temporal   SpO2: 100%   Weight: 265 lb (120.2 kg)   Height: 6' (1.829 m)                                              BP Readings from Last 3 Encounters:   22 120/78   06/15/21 112/68   20 (!) 144/98       NPO Status: Time of last liquid consumption:                         Time of last solid consumption:                         Date of last liquid consumption: 02/15/22                        Date of last solid food consumption: 22    BMI:   Wt Readings from Last 3 Encounters:   22 265 lb (120.2 kg)   06/15/21 262 lb (118.8 kg)   20 (!) 425 lb (192.8 kg)     Body mass index is 35.94 kg/m².     CBC:   Lab Results   Component Value Date    WBC 5.5 2021    RBC 4.95 2021    HGB 14.5 2021    HCT 43.4 2021    MCV 88 2021     2021       CMP:   Lab Results   Component Value Date     2021    K 4.0 2021     2021    CO2 24 2020    BUN 17 2020    CREATININE 0.76 2021    GFRAA >60 2021    LABGLOM >60 2021    GLUCOSE 104 2021    PROT 6.6 2021    CALCIUM 9.4 2021    BILITOT 0.8 2021    ALKPHOS 50 04/07/2021    AST 23 04/07/2021    ALT 27 04/07/2021       POC Tests: No results for input(s): POCGLU, POCNA, POCK, POCCL, POCBUN, POCHEMO, POCHCT in the last 72 hours. Coags: No results found for: PROTIME, INR, APTT    HCG (If Applicable): No results found for: PREGTESTUR, PREGSERUM, HCG, HCGQUANT     ABGs: No results found for: PHART, PO2ART, FYK7MZX, SNW7SUS, BEART, V7GTCXMT     Type & Screen (If Applicable):  No results found for: LABABO, LABRH    Drug/Infectious Status (If Applicable):  No results found for: HIV, HEPCAB    COVID-19 Screening (If Applicable): No results found for: COVID19        Anesthesia Evaluation  Patient summary reviewed and Nursing notes reviewed  Airway: Mallampati: II        Dental: normal exam         Pulmonary:normal exam    (+) sleep apnea:                             Cardiovascular:    (+) hypertension:, dysrhythmias: atrial fibrillation,                   Neuro/Psych:               GI/Hepatic/Renal:             Endo/Other:                     Abdominal:             Vascular: Other Findings:           Anesthesia Plan      MAC     ASA 2       Induction: intravenous. Anesthetic plan and risks discussed with patient.                       JEANIE Willis - ELY   2/16/2022

## 2022-02-16 NOTE — H&P
Patient Name: Sidra Aschoff  : 1970  MRN: [id number]  DATE: 22      ENDOSCOPY  History and Physical    Procedure:    [] Diagnostic Colonoscopy       [x] Screening Colonoscopy  [] EGD      [] ERCP      [] EUS       [] Other    [x] Previous office notes/History and Physical reviewed from the patients chart. Please see EMR for further details of HPI. I have examined the patient's status immediately prior to the procedure and:      Indications/HPI:    []Abdominal Pain   []Cancer- GI/Lung  []Fhx of colon CA/polyps  []History of Polyps   []Martes   []Melena  []Abnormal Imaging   []Dysphagia    []Persistent Pneumonia  []Anemia   []Food Impaction  []History of Polyps  []GI Bleed   []Pulmonary nodule/Mass  []Change in bowel habits  []Heartburn/Reflux  []Rectal Bleed (BRBPR)  []Chest Pain - Non Cardiac  []Heme (+) Stool  []Ulcers  []Constipation   []Hemoptysis   []Varices  []Diarrhea   []Hypoxemia  []Nausea/Vomiting   [x]Screening   []Crohns/Colitis  []Other:    Anesthesia:   [x] MAC [] Moderate Sedation   [] General   [] None     ROS: 12 pt Review of Symptoms was negative unless mentioned above    Medications:   Prior to Admission medications    Medication Sig Start Date End Date Taking? Authorizing Provider   Multiple Vitamins-Minerals (THERAPEUTIC MULTIVITAMIN-MINERALS) tablet Take 1 tablet by mouth daily   Yes Historical Provider, MD   Cyanocobalamin (VITAMIN B-12 PO) Take by mouth daily   Yes Historical Provider, MD   calcium citrate (CALCITRATE) 950 (200 Ca) MG tablet Take 1 tablet by mouth daily   Yes Historical Provider, MD   warfarin (COUMADIN) 5 MG tablet 15 mg Mon, Wed, Fri and 10 mg all other days.  12/15/18  Yes Historical Provider, MD   Fexofenadine HCl (ALLEGRA PO) Take by mouth    Historical Provider, MD   flecainide (TAMBOCOR) 100 MG tablet TAKE 1 TABLET BY MOUTH TWICE A DAY 20   Historical Provider, MD   metoprolol succinate (TOPROL XL) 25 MG extended release tablet TAKE 1 TABLET BY MOUTH TWICE EVERY DAY 20   Historical Provider, MD       Allergies: Allergies   Allergen Reactions    Seasonal         History of allergic reaction to anesthesia:  No    Past Medical History:  Past Medical History:   Diagnosis Date    A-fib ([address]     DVT (deep venous thrombosis) (HCC)     Heart beat abnormality     due to caffeine    Hypertension     Phlebitis     Pulmonary embolism (HCC)        Past Surgical History:  Past Surgical History:   Procedure Laterality Date    ABDOMINAL HERNIA REPAIR      ABLATION OF DYSRHYTHMIC FOCUS      Cardiac ablation    BARIATRIC SURGERY      gastric sleeve    CARPAL TUNNEL RELEASE Left     CHOLECYSTECTOMY      INGUINAL HERNIA REPAIR      KNEE SURGERY Bilateral     MANDIBLE SURGERY      ROTATOR CUFF REPAIR      SHOULDER SURGERY      WISDOM TOOTH EXTRACTION         Social History:  Social History     Tobacco Use    Smoking status: Former Smoker     Packs/day: 1.00     Years: 15.00     Pack years: 15.00     Types: Cigarettes     Quit date: 2009     Years since quittin.1    Smokeless tobacco: Former User     Quit date: 2009   Vaping Use    Vaping Use: Never used   Substance Use Topics    Alcohol use: Yes     Comment: social/occassional    Drug use: No       Vital Signs:   Vitals:    22 0658   BP: 120/78   Pulse: 87   Resp: 18   Temp: 97.9 °F (36.6 °C)   SpO2: 100%        Physical Exam:  Cardiac:  [x]WNL  []Comments:  Pulmonary:  [x]WNL   []Comments:   Neuro/Mental Status:  [x]WNL  []Comments:  Abdominal:  [x]WNL    []Comments:  Other:   []WNL  []Comments:    Informed Consent:  The risks and benefits of the procedure have been discussed with either the patient or if they cannot consent, their representative. Assessment:  Patient examined and appropriate for planned sedation and procedure. Plan:  Proceed with planned sedation and procedure as above.     Skyler Freeman MD  7:20 AM

## 2022-02-16 NOTE — ANESTHESIA POSTPROCEDURE EVALUATION
Department of Anesthesiology  Postprocedure Note    Patient: Gracie Acevedo  MRN: [id number]  YOB: 1970  Date of evaluation: 2/16/2022  Time:  7:56 AM     Procedure Summary     Date: 02/16/22 Room / Location: [address] / [place]    Anesthesia Start: 8408 Anesthesia Stop: 9646    Procedure: COLONOSCOPY WITH POLYPECTOMY (N/A ) Diagnosis: (Colon cancer screening Z12.11)    Surgeons: Sheree Rasmussen MD Responsible Provider: JEANIE Mason CRNA    Anesthesia Type: MAC ASA Status: 2          Anesthesia Type: MAC    Edgar Phase I: Edgar Score: 10    Edgar Phase II:      Last vitals: Reviewed and per EMR flowsheets.        Anesthesia Post Evaluation    Patient location during evaluation: bedside  Patient participation: complete - patient participated  Level of consciousness: awake and awake and alert  Pain score: 0  Airway patency: patent  Nausea & Vomiting: no nausea and no vomiting  Complications: no  Cardiovascular status: blood pressure returned to baseline and hemodynamically stable  Respiratory status: acceptable  Hydration status: euvolemic

## 2022-04-13 ENCOUNTER — PATIENT MESSAGE (OUTPATIENT)
Dept: FAMILY MEDICINE CLINIC | Age: 52
End: 2022-04-13

## 2022-04-14 NOTE — TELEPHONE ENCOUNTER
From: Vaishnavi Ramires  To: Dr. Christy Dudley  Sent: 4/13/2022 8:09 PM EDT  Subject: Blood work    Yes I was a former patient of Dr Shelli Escoto and she had scheduled me for blood work at some point last year which I never realized. Until they sent me a copy of the order recently. However it says the ordering physician was her and since she isnt with [facility] any longer they will not do the testing. Is it possible for you to put in a new order for such tests? ?

## (undated) DEVICE — TUBE SET 96 MM 64 MM H2O PERISTALTIC STD AUX CHANNEL

## (undated) DEVICE — Device: Brand: ENDO SMARTCAP

## (undated) DEVICE — TUBING, SUCTION, 1/4" X 10', STRAIGHT: Brand: MEDLINE

## (undated) DEVICE — SNARE ENDOSCP AD L240CM LOOP W10MM SHTH DIA2.4MM RND INSUL

## (undated) DEVICE — SINGLE PORT MANIFOLD: Brand: NEPTUNE 2

## (undated) DEVICE — ENDO CARRY-ON PROCEDURE KIT: Brand: ENDO CARRY-ON PROCEDURE KIT

## (undated) DEVICE — BRUSH ENDO CLN L90.5IN SHTH DIA1.7MM BRIST DIA5-7MM 2-6MM

## (undated) DEVICE — TRAP POLYP BALEEN

## (undated) DEVICE — GLOVE ORANGE PI 8 1/2   MSG9085